# Patient Record
Sex: MALE | Race: ASIAN | NOT HISPANIC OR LATINO
[De-identification: names, ages, dates, MRNs, and addresses within clinical notes are randomized per-mention and may not be internally consistent; named-entity substitution may affect disease eponyms.]

---

## 2021-04-15 ENCOUNTER — APPOINTMENT (OUTPATIENT)
Dept: DISASTER EMERGENCY | Facility: OTHER | Age: 66
End: 2021-04-15

## 2022-03-27 ENCOUNTER — EMERGENCY (EMERGENCY)
Facility: HOSPITAL | Age: 67
LOS: 1 days | Discharge: HOME | End: 2022-03-27
Attending: STUDENT IN AN ORGANIZED HEALTH CARE EDUCATION/TRAINING PROGRAM
Payer: MEDICAID

## 2022-03-27 VITALS
SYSTOLIC BLOOD PRESSURE: 144 MMHG | HEART RATE: 83 BPM | DIASTOLIC BLOOD PRESSURE: 76 MMHG | TEMPERATURE: 99 F | WEIGHT: 139.99 LBS | RESPIRATION RATE: 22 BRPM | OXYGEN SATURATION: 95 % | HEIGHT: 62 IN

## 2022-03-27 PROCEDURE — 99284 EMERGENCY DEPT VISIT MOD MDM: CPT

## 2022-03-27 PROCEDURE — 93010 ELECTROCARDIOGRAM REPORT: CPT

## 2022-03-27 NOTE — ED ADULT TRIAGE NOTE - PAIN: PRESENCE, MLM
How Severe Is Your Skin Lesion?: mild Have Your Skin Lesions Been Treated?: not been treated Is This A New Presentation, Or A Follow-Up?: Skin Lesions complains of pain/discomfort

## 2022-03-28 LAB
RAPID RVP RESULT: DETECTED
RV+EV RNA SPEC QL NAA+PROBE: DETECTED
SARS-COV-2 RNA SPEC QL NAA+PROBE: SIGNIFICANT CHANGE UP

## 2022-03-28 PROCEDURE — 0225U NFCT DS DNA&RNA 21 SARSCOV2: CPT

## 2022-03-28 PROCEDURE — 71046 X-RAY EXAM CHEST 2 VIEWS: CPT

## 2022-03-28 PROCEDURE — 71046 X-RAY EXAM CHEST 2 VIEWS: CPT | Mod: 26

## 2022-03-28 PROCEDURE — 99285 EMERGENCY DEPT VISIT HI MDM: CPT | Mod: 25

## 2022-03-28 PROCEDURE — 93005 ELECTROCARDIOGRAM TRACING: CPT

## 2022-03-28 RX ADMIN — Medication 100 MILLIGRAM(S): at 01:36

## 2022-03-28 NOTE — ED PROVIDER NOTE - PHYSICAL EXAMINATION
General: well appearing, no acute distress, AOx3  Skin: no rash, no pallor  Head: normocephalic, atraumatic  Eyes: clear conjunctiva, EOMI  ENMT: airway patent, no nasal discharge  Cardiovascular: normal rate, normal rhythm, S1/S2, no edema   Pulmonary: clear to auscultation bilaterally, no rales, rhonchi, or wheeze, ambulatory sat 98%  Abdomen: soft, nontender  Musculoskeletal: moving extremities well, no deformity  Psych: normal mood, normal affect

## 2022-03-28 NOTE — ED PROVIDER NOTE - PATIENT PORTAL LINK FT
You can access the FollowMyHealth Patient Portal offered by [facility] by registering at the following website: [email or web address]. By joining [facility] FollowMyHealth portal, you will also be able to view your health information using other applications (apps) compatible with our system.

## 2022-03-28 NOTE — ED ADULT NURSE NOTE - OBJECTIVE STATEMENT
66 y/o male PMHx HLD, HTN, DM arrives to [facility] ED by car from home with wife with c/o cough. Pt states sudden onset productive cough, shortness of breath and left sided chest pain x 1 week. Patient states he was around someone covid positive 2 weeks ago, tested negative today for covid. Patient is A&Ox4. Respirations spontaneous and unlabored. Denies palpitations. Placed on CM, NSR. No abdominal pain, soft NT/ND. No n/v/d. Denies urinary symptoms. Denies fever/chills. No sick contacts. Skin is warm/dry and normal for race. Ambulates independently at baseline.

## 2022-03-28 NOTE — ED PROVIDER NOTE - NSFOLLOWUPINSTRUCTIONS_ED_ALL_ED_FT
1) Follow up with your PCP within 1-3 days of being seen in the ED  2) Return to the ED immediately for new or worsening symptoms chest pain when not coughing, shortness of breath, fever, difficulty breathing, not improving  3) Please continue to take any home medications as prescribed  4) Your test results from your ED visit were discussed with you prior to discharge  5) You were provided with a copy of your test results  6) You can take the Tessalon Perles as needed, as prescribed for cough. Please follow all pharmacy packaging instructions and warnings.  7) Your respiratory virus swab is pending results. You can see your results in the patient portal tomorrow. Please follow directions in packet to login.

## 2022-03-28 NOTE — ED PROVIDER NOTE - ATTENDING CONTRIBUTION TO CARE
Attending Jeri: I performed a history and physical exam of the patient and discussed their management with the resident/fellow/ACP/student. I have reviewed the resident/fellow/ACP/student note and agree with the documented findings and plan of care, except as noted. I have personally performed a substantive portion of the visit including all aspects of the medical decision making. My medical decision making and observations are found above. Please refer to any progress notes for updates on clinical course.

## 2022-03-28 NOTE — ED PROVIDER NOTE - OBJECTIVE STATEMENT
Jameson eL, PGY-2- 67 year old male with a pmhx of HTN, DM, here for evaluation of cough x1 week. Pt reports sputum production that is white/yellow on occasional. Notes L side CP while coughing, not at rest or with exertion. No fever, chills, sob, abd pain, vomiting, diarrhea. Negative COVID-19 swab yesterday. No hx of TB.

## 2022-03-28 NOTE — ED PROVIDER NOTE - CLINICAL SUMMARY MEDICAL DECISION MAKING FREE TEXT BOX
Jameson Le, PGY-2- 67 year old male with a pmhx of HTN, DM, here for cough x1 week, some sputum production. L side CP with cough. Vitals stable on arrival, amb sat 98%. Pt in NAD. Likely viral URI. Will obtain RVP, CXR, treat with tessalon perles and reassess. Suspicion for bacterial PNA low. Low suspicion for ACS/PE/dissection Jameson Le, PGY-2- 67 year old male with a pmhx of HTN, DM, here for cough x1 week, some sputum production. L side CP with cough. Vitals stable on arrival, amb sat 98%. Pt in NAD. Likely viral URI. Will obtain RVP, CXR, treat with tessalon perles and reassess. Suspicion for bacterial PNA low. Low suspicion for ACS/PE/dissection    Attending Jeri: 68 y/o M w/ PMH of HTN, DM presenting w/ cough. Seen in [place], w/ wife. Reports for over a week experiencing cough. productive w/ phlegm. Endorsing pain in throat and upper chest only when cough. No SOB. No chest pain when not coughing. Had negative covid swab yesterday. Reports 2 days prior to symptoms starting was spending time w/ grandson who had viral respiratory infection. No fevers or chills. Pt's main concern is his coughing. Pt well appearing on exam, no acute distress. Lungs clear. HR regular. Abd nondistended/soft/nontender. Non focal neuro exam. Pt w/ productive cough. Suspect viral URI. EKG unremarkable. Do not suspect cardiac pathology. No fevers. Do not suspect bacterial pna. Do not suspect aortic pathology. No concern for PE. Will obtain screening CXR. Send RVP. Give tessalon. Will reassess the need for additional interventions as clinically warranted.

## 2022-03-28 NOTE — ED PROVIDER NOTE - PROGRESS NOTE DETAILS
Jameson Le, PGY-2- Patient feels improved, Tessalon perles improved symptoms. CXR reviewed, no focal PNA. Likely viral. Will dc home. PCP f/u advised. Discussed results of work up with patient. Patient agrees with plan to discharge home. All questions answered regarding plan. Strict return precautions given.

## 2025-04-23 PROBLEM — Z00.00 ENCOUNTER FOR PREVENTIVE HEALTH EXAMINATION: Status: ACTIVE | Noted: 2025-04-23

## 2025-05-19 ENCOUNTER — APPOINTMENT (OUTPATIENT)
Dept: PULMONOLOGY | Facility: CLINIC | Age: 70
End: 2025-05-19

## 2025-08-07 ENCOUNTER — APPOINTMENT (OUTPATIENT)
Dept: PULMONOLOGY | Facility: CLINIC | Age: 70
End: 2025-08-07